# Patient Record
Sex: MALE | Race: WHITE | NOT HISPANIC OR LATINO | Employment: FULL TIME | URBAN - NONMETROPOLITAN AREA
[De-identification: names, ages, dates, MRNs, and addresses within clinical notes are randomized per-mention and may not be internally consistent; named-entity substitution may affect disease eponyms.]

---

## 2023-10-06 ENCOUNTER — OFFICE VISIT (OUTPATIENT)
Dept: URGENT CARE | Facility: CLINIC | Age: 18
End: 2023-10-06
Payer: COMMERCIAL

## 2023-10-06 VITALS
OXYGEN SATURATION: 97 % | DIASTOLIC BLOOD PRESSURE: 78 MMHG | SYSTOLIC BLOOD PRESSURE: 114 MMHG | HEART RATE: 70 BPM | WEIGHT: 240 LBS | BODY MASS INDEX: 31.81 KG/M2 | RESPIRATION RATE: 16 BRPM | TEMPERATURE: 98.7 F | HEIGHT: 73 IN

## 2023-10-06 DIAGNOSIS — U07.1 COVID-19: Primary | ICD-10-CM

## 2023-10-06 PROCEDURE — 99213 OFFICE O/P EST LOW 20 MIN: CPT

## 2023-10-06 NOTE — LETTER
[address]  [facility]  [address] [facility] [id number]  Dept: [phone]    October 6, 2023    Patient: Sherryle Sables  YOB: 2005    Sherryle Sables was seen and evaluated at our [facility]. Covid test is positive. he may return to work on 10/9/2023, as this is 5 days from the onset of symptoms. Upon return, they must then adhere to strict masking for an additional 5 days.     Sincerely,      Hilary Contreras PA-C

## 2023-10-06 NOTE — PROGRESS NOTES
[facility]        NAME: Louie Mei is a 19 y.o. male  : 2005    MRN: [id number]  DATE: 2023  TIME: 7:28 PM    Assessment and Plan   COVID-19 [U07.1]  1. COVID-19          Patient tested positive on 10/3/2023 at home. Patient Instructions     Plenty of fluids  Can use honey   Cool mist humidifier   Warm gargle with salt water for sore throat   Use Tylenol/ibuprofen as needed for fever or pain   Follow up with PCP in 3-5 days. Proceed to  ER if symptoms worsen. Chief Complaint     Chief Complaint   Patient presents with   • covid     Tested positive for covid on 10/3/23 and has been off work. Had sore throat, fever, slight cough and congested. Starting to feel better-just weak         History of Present Illness       Patient is presenting for testing positive for COVID 3 days ago. He had a sore throat, fever, slight cough and congestion. He started to feel better but just feels fatigued. He has been off work since his positive test.      Review of Systems   Review of Systems   Constitutional: Positive for fever. Negative for chills and fatigue. HENT: Positive for congestion and sore throat. Negative for ear pain, postnasal drip, rhinorrhea, sinus pressure, sneezing and tinnitus. Eyes: Negative for photophobia, redness and itching. Respiratory: Positive for cough. Negative for chest tightness, shortness of breath and wheezing. Cardiovascular: Negative for chest pain. Skin: Negative for color change and pallor. Neurological: Negative for dizziness, light-headedness and headaches. Psychiatric/Behavioral: Negative for confusion. Current Medications     No current outpatient medications on file.     Current Allergies     Allergies as of 10/06/2023   • (No Known Allergies)            The following portions of the patient's history were reviewed and updated as appropriate: allergies, current medications, past family history, past medical history, past social history, past surgical history and problem list.     Past Medical History:   Diagnosis Date   • Known health problems: none        Past Surgical History:   Procedure Laterality Date   • ADENOIDECTOMY     • TONSILLECTOMY     • WISDOM TOOTH EXTRACTION         Family History   Problem Relation Age of Onset   • No Known Problems Mother    • No Known Problems Father          Medications have been verified. Objective   /78   Pulse 70   Temp 98.7 °F (37.1 °C)   Resp 16   Ht 6' 1" (1.854 m)   Wt 109 kg (240 lb)   SpO2 97%   BMI 31.66 kg/m²        Physical Exam     Physical Exam  Constitutional:       Appearance: Normal appearance. HENT:      Head: Normocephalic. Eyes:      Extraocular Movements: Extraocular movements intact. Pupils: Pupils are equal, round, and reactive to light. Cardiovascular:      Rate and Rhythm: Normal rate and regular rhythm. Pulses: Normal pulses. Heart sounds: Normal heart sounds. Pulmonary:      Effort: Pulmonary effort is normal.      Breath sounds: Normal breath sounds. Skin:     General: Skin is warm and dry. Neurological:      General: No focal deficit present. Mental Status: He is alert and oriented to person, place, and time. Mental status is at baseline.